# Patient Record
(demographics unavailable — no encounter records)

---

## 2025-07-08 NOTE — HISTORY OF PRESENT ILLNESS
[de-identified] : Patient was taking Doxycycline 60 mg BID - on/off for about 1 year - she noted some itching of her body with small dots - she than noted some lip tingling - consistent - she stopped the antibiotic 1 year ago - resolved after a few days - now she desires to know if she is allergic to the medication.      She tolerates spironolactone - Eucrissa and metronidazole.    Her acne and rosacea are worsening and she desires to know if she can take it again

## 2025-07-08 NOTE — PHYSICAL EXAM
[Alert] : alert [Well Nourished] : well nourished [Healthy Appearance] : healthy appearance [No Acute Distress] : no acute distress [Well Developed] : well developed [Normal Voice/Communication] : normal voice communication [No Neck Mass] : no neck mass was observed [No LAD] : no lymphadenopathy [Normal Rate and Effort] : normal respiratory rhythm and effort [No Crackles] : no crackles [No Retractions] : no retractions [Wheezing] : no wheezing was heard [Normal Rate] : heart rate was normal  [Normal S1, S2] : normal S1 and S2 [Regular Rhythm] : with a regular rhythm [Normal Cervical Lymph Nodes] : cervical [Normal Mood] : mood was normal [Judgment and Insight Age Appropriate] : judgement and insight is age appropriate [de-identified] : acneiform facial rash with erythema

## 2025-07-08 NOTE — ASSESSMENT
[FreeTextEntry1] : Acne rosacea - good response to Doxycycline in the past -   Patient will RV for oral Doxycycline challenge under observation It is unclear if her symptoms are secondary to the antibiotics therapy in the past.

## 2025-07-08 NOTE — SOCIAL HISTORY
[FreeTextEntry1] : Elder  Lives with spouse and 2 children  [House] : [unfilled] lives in a house  [Dog] : dog [] :  [Smokers in Household] : there are no smokers in the home

## 2025-07-29 NOTE — ASSESSMENT
[FreeTextEntry1] : Patient tolerated her Doxycycline oral challenge with no immediate allergic reaction.  She will contact the office if she develops any delayed allergic reaction - otherwise she will contact the office on Monday.

## 2025-07-29 NOTE — HISTORY OF PRESENT ILLNESS
[de-identified] : Patient being seen today for Doxycycline oral challenge  Skin Substitute Text: The defect edges were debeveled with a #15 scalpel blade.  Given the location of the defect, shape of the defect and the proximity to free margins a skin substitute graft was deemed most appropriate.  The graft material was trimmed to fit the size of the defect. The graft was then placed in the primary defect and oriented appropriately.